# Patient Record
Sex: FEMALE | Race: OTHER | HISPANIC OR LATINO
[De-identification: names, ages, dates, MRNs, and addresses within clinical notes are randomized per-mention and may not be internally consistent; named-entity substitution may affect disease eponyms.]

---
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
## 2020-12-16 PROBLEM — Z87.09 HISTORY OF ACUTE SINUSITIS: Status: RESOLVED | Noted: 2018-01-16 | Resolved: 2020-12-16

## 2021-06-06 ENCOUNTER — EMERGENCY (EMERGENCY)
Facility: HOSPITAL | Age: 21
LOS: 1 days | Discharge: HOME | End: 2021-06-06
Attending: EMERGENCY MEDICINE | Admitting: EMERGENCY MEDICINE
Payer: COMMERCIAL

## 2021-06-06 VITALS
OXYGEN SATURATION: 100 % | TEMPERATURE: 98 F | DIASTOLIC BLOOD PRESSURE: 77 MMHG | RESPIRATION RATE: 16 BRPM | HEART RATE: 100 BPM | SYSTOLIC BLOOD PRESSURE: 128 MMHG

## 2021-06-06 VITALS
DIASTOLIC BLOOD PRESSURE: 61 MMHG | TEMPERATURE: 99 F | RESPIRATION RATE: 15 BRPM | SYSTOLIC BLOOD PRESSURE: 114 MMHG | OXYGEN SATURATION: 100 % | HEART RATE: 92 BPM

## 2021-06-06 DIAGNOSIS — R10.9 UNSPECIFIED ABDOMINAL PAIN: ICD-10-CM

## 2021-06-06 LAB
ALBUMIN SERPL ELPH-MCNC: 4.7 G/DL — SIGNIFICANT CHANGE UP (ref 3.3–5)
ALP SERPL-CCNC: 84 U/L — SIGNIFICANT CHANGE UP (ref 40–120)
ALT FLD-CCNC: 13 U/L — SIGNIFICANT CHANGE UP (ref 4–33)
ANION GAP SERPL CALC-SCNC: 14 MMOL/L — SIGNIFICANT CHANGE UP (ref 7–14)
APPEARANCE UR: ABNORMAL
APTT BLD: 29.6 SEC — SIGNIFICANT CHANGE UP (ref 27–36.3)
AST SERPL-CCNC: 16 U/L — SIGNIFICANT CHANGE UP (ref 4–32)
BACTERIA # UR AUTO: ABNORMAL
BASE EXCESS BLDV CALC-SCNC: -1.2 MMOL/L — SIGNIFICANT CHANGE UP (ref -3–2)
BASOPHILS # BLD AUTO: 0.1 K/UL — SIGNIFICANT CHANGE UP (ref 0–0.2)
BASOPHILS NFR BLD AUTO: 1.1 % — SIGNIFICANT CHANGE UP (ref 0–2)
BILIRUB SERPL-MCNC: 0.3 MG/DL — SIGNIFICANT CHANGE UP (ref 0.2–1.2)
BILIRUB UR-MCNC: NEGATIVE — SIGNIFICANT CHANGE UP
BLOOD GAS VENOUS - CREATININE: 0.8 MG/DL — SIGNIFICANT CHANGE UP (ref 0.5–1.3)
BLOOD GAS VENOUS COMPREHENSIVE RESULT: SIGNIFICANT CHANGE UP
BUN SERPL-MCNC: 9 MG/DL — SIGNIFICANT CHANGE UP (ref 7–23)
CALCIUM SERPL-MCNC: 9.2 MG/DL — SIGNIFICANT CHANGE UP (ref 8.4–10.5)
CHLORIDE BLDV-SCNC: 106 MMOL/L — SIGNIFICANT CHANGE UP (ref 96–108)
CHLORIDE SERPL-SCNC: 102 MMOL/L — SIGNIFICANT CHANGE UP (ref 98–107)
CO2 SERPL-SCNC: 20 MMOL/L — LOW (ref 22–31)
COLOR SPEC: YELLOW — SIGNIFICANT CHANGE UP
CREAT SERPL-MCNC: 0.75 MG/DL — SIGNIFICANT CHANGE UP (ref 0.5–1.3)
DIFF PNL FLD: NEGATIVE — SIGNIFICANT CHANGE UP
EOSINOPHIL # BLD AUTO: 0 K/UL — SIGNIFICANT CHANGE UP (ref 0–0.5)
EOSINOPHIL NFR BLD AUTO: 0 % — SIGNIFICANT CHANGE UP (ref 0–6)
EPI CELLS # UR: 23 /HPF — HIGH (ref 0–5)
GAS PNL BLDV: 136 MMOL/L — SIGNIFICANT CHANGE UP (ref 136–146)
GLUCOSE BLDV-MCNC: 109 MG/DL — HIGH (ref 70–99)
GLUCOSE SERPL-MCNC: 108 MG/DL — HIGH (ref 70–99)
GLUCOSE UR QL: NEGATIVE — SIGNIFICANT CHANGE UP
HCO3 BLDV-SCNC: 23 MMOL/L — SIGNIFICANT CHANGE UP (ref 20–27)
HCT VFR BLD CALC: 37.5 % — SIGNIFICANT CHANGE UP (ref 34.5–45)
HCT VFR BLDA CALC: 39 % — SIGNIFICANT CHANGE UP (ref 34.5–46.5)
HGB BLD CALC-MCNC: 12.7 G/DL — SIGNIFICANT CHANGE UP (ref 11.5–15.5)
HGB BLD-MCNC: 12 G/DL — SIGNIFICANT CHANGE UP (ref 11.5–15.5)
HYALINE CASTS # UR AUTO: 4 /LPF — SIGNIFICANT CHANGE UP (ref 0–7)
IANC: 8.15 K/UL — SIGNIFICANT CHANGE UP (ref 1.5–8.5)
INR BLD: 1.26 RATIO — HIGH (ref 0.88–1.16)
KETONES UR-MCNC: ABNORMAL
LACTATE BLDV-MCNC: 1.8 MMOL/L — SIGNIFICANT CHANGE UP (ref 0.5–2)
LEUKOCYTE ESTERASE UR-ACNC: NEGATIVE — SIGNIFICANT CHANGE UP
LYMPHOCYTES # BLD AUTO: 0.2 K/UL — LOW (ref 1–3.3)
LYMPHOCYTES # BLD AUTO: 2.1 % — LOW (ref 13–44)
MCHC RBC-ENTMCNC: 28.5 PG — SIGNIFICANT CHANGE UP (ref 27–34)
MCHC RBC-ENTMCNC: 32 GM/DL — SIGNIFICANT CHANGE UP (ref 32–36)
MCV RBC AUTO: 89.1 FL — SIGNIFICANT CHANGE UP (ref 80–100)
MONOCYTES # BLD AUTO: 0.61 K/UL — SIGNIFICANT CHANGE UP (ref 0–0.9)
MONOCYTES NFR BLD AUTO: 6.5 % — SIGNIFICANT CHANGE UP (ref 2–14)
NEUTROPHILS # BLD AUTO: 8.42 K/UL — HIGH (ref 1.8–7.4)
NEUTROPHILS NFR BLD AUTO: 90.3 % — HIGH (ref 43–77)
NITRITE UR-MCNC: NEGATIVE — SIGNIFICANT CHANGE UP
PCO2 BLDV: 33 MMHG — LOW (ref 41–51)
PH BLDV: 7.44 — HIGH (ref 7.32–7.43)
PH UR: 8.5 — HIGH (ref 5–8)
PLAT MORPH BLD: NORMAL — SIGNIFICANT CHANGE UP
PLATELET # BLD AUTO: 281 K/UL — SIGNIFICANT CHANGE UP (ref 150–400)
PLATELET COUNT - ESTIMATE: NORMAL — SIGNIFICANT CHANGE UP
PO2 BLDV: 26 MMHG — LOW (ref 35–40)
POTASSIUM BLDV-SCNC: 3.5 MMOL/L — SIGNIFICANT CHANGE UP (ref 3.4–4.5)
POTASSIUM SERPL-MCNC: 3.8 MMOL/L — SIGNIFICANT CHANGE UP (ref 3.5–5.3)
POTASSIUM SERPL-SCNC: 3.8 MMOL/L — SIGNIFICANT CHANGE UP (ref 3.5–5.3)
PROT SERPL-MCNC: 8 G/DL — SIGNIFICANT CHANGE UP (ref 6–8.3)
PROT UR-MCNC: ABNORMAL
PROTHROM AB SERPL-ACNC: 14.3 SEC — HIGH (ref 10.6–13.6)
RBC # BLD: 4.21 M/UL — SIGNIFICANT CHANGE UP (ref 3.8–5.2)
RBC # FLD: 13.2 % — SIGNIFICANT CHANGE UP (ref 10.3–14.5)
RBC BLD AUTO: NORMAL — SIGNIFICANT CHANGE UP
RBC CASTS # UR COMP ASSIST: 9 /HPF — HIGH (ref 0–4)
SAO2 % BLDV: 48.1 % — LOW (ref 60–85)
SARS-COV-2 RNA SPEC QL NAA+PROBE: SIGNIFICANT CHANGE UP
SMUDGE CELLS # BLD: PRESENT — SIGNIFICANT CHANGE UP
SODIUM SERPL-SCNC: 136 MMOL/L — SIGNIFICANT CHANGE UP (ref 135–145)
SP GR SPEC: 1.03 — HIGH (ref 1.01–1.02)
UROBILINOGEN FLD QL: SIGNIFICANT CHANGE UP
WBC # BLD: 9.33 K/UL — SIGNIFICANT CHANGE UP (ref 3.8–10.5)
WBC # FLD AUTO: 9.33 K/UL — SIGNIFICANT CHANGE UP (ref 3.8–10.5)
WBC UR QL: 14 /HPF — HIGH (ref 0–5)

## 2021-06-06 PROCEDURE — 99285 EMERGENCY DEPT VISIT HI MDM: CPT

## 2021-06-06 PROCEDURE — 76830 TRANSVAGINAL US NON-OB: CPT | Mod: 26

## 2021-06-06 RX ORDER — KETOROLAC TROMETHAMINE 30 MG/ML
15 SYRINGE (ML) INJECTION ONCE
Refills: 0 | Status: DISCONTINUED | OUTPATIENT
Start: 2021-06-06 | End: 2021-06-06

## 2021-06-06 RX ORDER — ONDANSETRON 8 MG/1
4 TABLET, FILM COATED ORAL ONCE
Refills: 0 | Status: COMPLETED | OUTPATIENT
Start: 2021-06-06 | End: 2021-06-06

## 2021-06-06 RX ADMIN — Medication 15 MILLIGRAM(S): at 15:13

## 2021-06-06 RX ADMIN — Medication 15 MILLIGRAM(S): at 13:54

## 2021-06-06 RX ADMIN — ONDANSETRON 4 MILLIGRAM(S): 8 TABLET, FILM COATED ORAL at 13:54

## 2021-06-06 RX ADMIN — Medication 15 MILLIGRAM(S): at 16:55

## 2021-06-06 NOTE — CONSULT NOTE ADULT - SUBJECTIVE AND OBJECTIVE BOX
GYN ED CONSULT NOTE    SUBJECTIVE:    22yo P0 LMP=5/15/21 PMH significant for chronic constipation presenting to the ED with LLQ that started the AM. She states that she was in bed this AM when she experienced sharp LLQ pain. She states that the pain was sharp and worse with movement. It was non-radiating. She had accompanying nausea and had one episode NBNB emesis because of the pain. She took po tylenol x1 with no relief. She came to the ED because of the pain. Since being in the ED her nausea has resolved. No further emesis. She is ambulating normally. Her pain was relieved with Toradol that she received.     Her last BM was 6/4. She states that she normally has regular BMs and takes Miralax daily and normally has a BM daily. She states that when she was 17 years old she had abdominal pain and was treated for constipation.     Pt denies fever, chills, diarrhea, headache, dizzyness, syncope, chest pain, palpitations, shortness of breath, dysuria, urgency, or vaginal bleeding.     In ED today she received Toradol She had a TVUS that showed a 3.7cm R hemorrhagic cyst. Normal appearing L ovary. Normal bilateral ovarian flow.     Primary OB/GYN: Dr. Galicia/Dr. Obregon  GYNH: denies hx of fibroids, ov cysts, abnl paps, sti. She states was on OCPs in 2019 for dysmenorrhea. She self discontinued.    PMSH:+constipation  MEDS: none  ALL: nkda  SOCH: denies t/e/d; safe at home  FAMH: denies fam hx of breast/uterine/ovarian/colon cancer  ROS: negative except per hpi    OBJECTIVE:    VITAL SIGNS:  Vital Signs Last 24 Hrs  T(C): 36.9 (2021 12:39), Max: 36.9 (2021 12:39)  T(F): 98.4 (2021 12:39), Max: 98.4 (2021 12:39)  HR: 106 (2021 15:14) (100 - 106)  BP: 126/78 (2021 15:14) (126/78 - 128/77)  BP(mean): --  RR: 16 (2021 15:14) (15 - 16)  SpO2: 100% (2021 15:14) (100% - 100%)    CAPILLARY BLOOD GLUCOSE    PHYSICAL EXAM:  GEN: NAD, A&Ox3  CV: RRR, no m/g/r  LUNGS: CTAB  ABD: soft, NT, ND, +BS, tender to deep palpation in LLQ. No rebound.   PELVIC: No CMT or adnexal tenderness.   EXT: WWP, no edema/TTP                            12.0   9.33  )-----------( 281      ( 2021 14:16 )             37.5   baso x      eos x      imm gran x      lymph x      mono x      poly x              136  |  102  |  9   ----------------------------<  108<H>  3.8   |  20<L>  |  0.75    Ca    9.2      2021 14:16    TPro  8.0  /  Alb  4.7  /  TBili  0.3  /  DBili  x   /  AST  16  /  ALT  13  /  AlkPhos  84        Urinalysis Basic - ( 2021 14:16 )    Color: Yellow / Appearance: Slightly Turbid / S.032 / pH: x  Gluc: x / Ketone: Trace  / Bili: Negative / Urobili: <2 mg/dL   Blood: x / Protein: 30 mg/dL / Nitrite: Negative   Leuk Esterase: Negative / RBC: 9 /HPF / WBC 14 /HPF   Sq Epi: x / Non Sq Epi: 23 /HPF / Bacteria: Many

## 2021-06-06 NOTE — ED PROVIDER NOTE - CLINICAL SUMMARY MEDICAL DECISION MAKING FREE TEXT BOX
Jorje: awoke with sever llq pain, family hx of ovarian cysts and kidney stones. No vaginal d/c. Will ensure not pregnant and get imaging. treat pain aggressively.

## 2021-06-06 NOTE — ED PROVIDER NOTE - PATIENT PORTAL LINK FT
You can access the FollowMyHealth Patient Portal offered by [facility] by registering at the following website: [email or web address]. By joining [facility]’s FollowMyHealth portal, you will also be able to view your health information using other applications (apps) compatible with our system.

## 2021-06-06 NOTE — ED PROVIDER NOTE - PROGRESS NOTE DETAILS
IVANNA Patel: GYN aware of pt. Ultrasound called, pt will be brought up for US IVANNA Patel: Pt resting comfortably. States pain has improved. Pt had a BM. Will continue to monitor IVANNA Patel: Pt feeling better. Pain continues to improve. Pt cleared by GYN.  The patient was given verbal and written discharge instructions. Specifically, instructions when to return to the ED and when to seek follow-up from their pcp was discussed. Any specialty follow-up was discussed, including how to make an appointment.  Any necessary referral lists provided.  Instructions were discussed in simple, plain language and was understood by the patient. The patient understands that should their symptoms worsen or any new symptoms arise, they should return to the ED immediately for further evaluation. All pt's questions were answered. Patient verbalizes understanding.

## 2021-06-06 NOTE — CONSULT NOTE ADULT - ASSESSMENT
20yo P0 LMP=5/15/21 PMH significant for chronic constipation presenting to the ED with LLQ that started the AM. TVUS that shows a 3.7cm hemorrhagic cyst on the R ovary which is contralateral to the patient's pain. No clinical signs at this time of ovarian torsion. Pt has a benign physical exam and ambulating without difficulty at this time. She states she is constipated and has not had a BM since 6/4. However given patient's episode of sharp pain at home and episode of emesis x1 earlier today in the setting of an ovarian cyst, the patient and mother were counseled on the differential diagnosis of ovarian torsion versus constipation.

## 2021-06-06 NOTE — ED ADULT NURSE REASSESSMENT NOTE - NS ED NURSE REASSESS COMMENT FT1
Pt resting comfortably in bed, states "I had a bowel movement. I am feeling a little better but I still feel that pain in the left side, It's more so discomfort right now." Vital signs stable at this time. Will reassess.

## 2021-06-06 NOTE — CONSULT NOTE ADULT - PROBLEM SELECTOR RECOMMENDATION 9
-Enema for constipation  -hold pain meds  -will reassess pain after enema.  -If patient develops abdominal pain or n/v returns then patient has been counseled on recommendation on proceeding with diagnostic laparoscopy to rule out ovarian torsion  -NPO      Pt evaluated and counseled by Dr. Grayson Treivno, pgy4 -Enema for constipation  -hold pain meds  -will reassess pain after enema.  -If patient develops abdominal pain or n/v returns then patient has been counseled on recommendation on proceeding with diagnostic laparoscopy to rule out ovarian torsion  -NPO  -urine neg for pregnancy     Pt evaluated and counseled by Dr. Grayson Trevino, pgy4

## 2021-06-06 NOTE — ED ADULT TRIAGE NOTE - CHIEF COMPLAINT QUOTE
llq pains since this am. denies problems urinating. vomited x 1 today lmp- 5/16. last bm yesterday- states feels constipated

## 2021-06-06 NOTE — ED ADULT NURSE NOTE - OBJECTIVE STATEMENT
Pt presents to intake, A&Ox4, ambulatory w/o assistance, here for evaluation of LLQ pain w/ nausea and vomiting since this morning. Pt states pain started suddenly this morning and progressively became worse. Denies chest pain, shortness of breath, palpitations, diaphoresis, headaches, fevers, dizziness, diarrhea, or urinary symptoms at this time. IV established in right arm with a 18G, labs drawn and sent, call bell in reach, warm blanket provided, bed in lowest position, side rails up x2, MD evaluation in progress. Will continue to monitor.

## 2021-06-06 NOTE — ED PROVIDER NOTE - ATTENDING CONTRIBUTION TO CARE
I performed a history and physical exam of the patient and discussed their management with the resident and /or advanced care provider. I reviewed the resident and /or ACP's note and agree with the documented findings and plan of care. My medical decision making and observations are found above.  Lungs clear abd with lower pain to palpation l>r

## 2021-06-06 NOTE — ED PROVIDER NOTE - NSFOLLOWUPINSTRUCTIONS_ED_ALL_ED_FT
Ruptured Ovarian Cyst    WHAT YOU NEED TO KNOW:    What is a ruptured ovarian cyst? A ruptured ovarian cyst is a cyst that breaks open. A cyst is a sac that grows on an ovary. This sac usually contains fluid, but may sometimes have blood or tissue in it. A large cyst that ruptures may lead to problems that need immediate care.    What causes or increases my risk for a ruptured ovarian cyst? You may be at higher risk for a ruptured ovarian cyst if you have polycystic ovarian syndrome (PCOS). PCOS causes many cysts to grow on your ovary. Any of the following can lead to a ruptured cyst:     Hormone changes around the time of your monthly period       Pressure on the cyst from sports, sex, or an injury to the area (usually large cysts)       Pregnancy    What are the signs and symptoms of a ruptured ovarian cyst? You may have no signs or symptoms, or you may have any of the following:     Pain that can range from mild to severe or be mild at first but become severe quickly       Sudden, sharp, or stabbing pain that happens on one side       Pain that starts during activity or sex, or that gets worse when you move       Tenderness in the area of your ovary       A low fever       Nausea, vomiting, or dizziness    How is a ruptured ovarian cyst diagnosed? Your healthcare provider will examine you and ask about your symptoms. If you have pain, tell your provider what you were doing when you first felt the pain. Include anything that helps or increases the pain. Tell your provider if you or anyone in your family has a history of breast or ovarian cancer, or PCOS. You may need any of the following:     Blood tests are used to check for pregnancy or an ectopic pregnancy. This is when a fertilized egg is growing in a fallopian tube instead of the womb. You may also need to have hormone levels checked. Blood tests may also be used to check for signs of an infection or tumor.       Ultrasound pictures may show a cyst on your ovary. For this test, an ultrasound wand is inserted into your vagina and guided up toward your uterus. This helps your healthcare provider get a closer look at your ovaries.     How is a ruptured ovarian cyst treated? Treatment depends on your age, the size of the cyst, and if it caused problems that need treatment. Treatment may not be needed if the cyst was small or your body absorbed the fluid that came out of the cyst when it ruptured. You may need any of the following:     NSAIDs, such as ibuprofen, help decrease swelling, pain, and fever. This medicine is available with or without a doctor's order. NSAIDs can cause stomach bleeding or kidney problems in certain people. If you take blood thinner medicine, always ask if NSAIDs are safe for you. Always read the medicine label and follow directions. Do not give these medicines to children under 6 months of age without direction from your child's healthcare provider.      Prescription pain medicine may be given. Ask your healthcare provider how to take this medicine safely. Some prescription pain medicines contain acetaminophen. Do not take other medicines that contain acetaminophen without talking to your healthcare provider. Too much acetaminophen may cause liver damage. Prescription pain medicine may cause constipation. Ask your healthcare provider how to prevent or treat constipation.       Antibiotics may be needed to prevent or fight an infection caused by bacteria.       Surgery may be needed to remove fluid or blood in the area of the ruptured cyst. The outside of the ruptured cyst may also need to be removed.     What can I do to manage or prevent a ruptured ovarian cyst?     Apply heat where you have pain, as directed. Heat can help relieve mild pain. Use a heating pad (set on low) or hot water bottle. Wrap the pad or bottle in a towel before you apply it to your skin. Apply heat for 20 minutes every hour, or as directed. A warm bath may also help relieve the pain.      Ask when to come in for a follow-up examination. You may need another ultrasound 6 weeks after your cyst was treated. This will help make sure the cyst is no longer growing or causing health problems. You may also need ultrasound tests for 2 or 3 monthly periods to see how hormones affect your ovaries.      Ask about birth control pills. These may help reduce your risk for cysts. Ask your healthcare provider if birth control pills are right for you. The risk for a blood clot is higher if you take birth control pills, especially if you are older than 35 or smoke.       Have a pelvic exam every year. This may also be called a well woman visit. The exam will include a Pap smear to check for certain cancers. Your healthcare provider will also press on your abdomen to check for lumps or other problems. A pelvic exam can help find problems early. This makes treatment easier and more effective. Tell your healthcare provider if you notice any changes in your monthly periods. Examples include periods that start on a different day than usual, or are lighter or heavier than usual. Tell your provider if you have worse pain than usual, or if the pain is different than you had before.    Call 911 for any of the following:     You are too weak or dizzy to stand up.        When should I seek immediate care?     You have severe pain in your pelvis or in your abdomen.       You have pain along with a fever, nausea, or vomiting.       You have signs of shock from blood loss, such as dizziness, cold or clammy skin, or fast breathing.    When should I contact my healthcare provider?     You notice changes in your monthly periods, or you begin to have nausea or vomiting with your periods.      You have new or worsening symptoms.      Your pain does not get better with pain medicine.      You have pain during sex.      You have bleeding from your vagina that is not your period.      Your abdomen is swollen, or you have a full or heavy feeling in your lower abdomen.      You have trouble urinating.      You have questions or concerns about your condition or care.    CARE AGREEMENT:    You have the right to help plan your care. Learn about your health condition and how it may be treated. Discuss treatment options with your healthcare providers to decide what care you want to receive. You always have the right to refuse treatment.

## 2021-06-06 NOTE — ED PROVIDER NOTE - OBJECTIVE STATEMENT
22 y/o F, denies any PMH, c/o waking up w/ severe LLQ pain this morning at 5AM. Pain has been constant but waxing and waning in intensity. Currently 8/10. Pt took Tylenol without relief. She vomiting twice NBNB. Denies having this pain before. Denies diarrhea, vaginal discharge, vaginal bleeding, dysuria or hematuria. Reports last sexual intercourse was 1-2 years ago.

## 2021-06-07 LAB
CULTURE RESULTS: SIGNIFICANT CHANGE UP
SPECIMEN SOURCE: SIGNIFICANT CHANGE UP

## 2021-11-03 ENCOUNTER — APPOINTMENT (OUTPATIENT)
Dept: OBGYN | Facility: CLINIC | Age: 21
End: 2021-11-03
Payer: COMMERCIAL

## 2021-11-03 VITALS
WEIGHT: 131 LBS | DIASTOLIC BLOOD PRESSURE: 64 MMHG | BODY MASS INDEX: 23.21 KG/M2 | HEIGHT: 63 IN | SYSTOLIC BLOOD PRESSURE: 100 MMHG

## 2021-11-03 DIAGNOSIS — Z83.49 FAMILY HISTORY OF OTHER ENDOCRINE, NUTRITIONAL AND METABOLIC DISEASES: ICD-10-CM

## 2021-11-03 PROCEDURE — 99395 PREV VISIT EST AGE 18-39: CPT

## 2021-11-03 PROCEDURE — 81003 URINALYSIS AUTO W/O SCOPE: CPT | Mod: QW

## 2021-11-04 LAB
BILIRUB UR QL STRIP: NORMAL
GLUCOSE UR-MCNC: NORMAL
HCG UR QL: 0.2 EU/DL
HGB UR QL STRIP.AUTO: NORMAL
KETONES UR-MCNC: NORMAL
LEUKOCYTE ESTERASE UR QL STRIP: NORMAL
NITRITE UR QL STRIP: NORMAL
PH UR STRIP: 5
PROT UR STRIP-MCNC: NORMAL
SP GR UR STRIP: 1.02

## 2021-11-05 PROBLEM — Z83.49 FAMILY HISTORY OF GRAVES' DISEASE: Status: ACTIVE | Noted: 2021-11-03

## 2021-11-05 RX ORDER — FLUCONAZOLE 150 MG/1
150 TABLET ORAL
Qty: 1 | Refills: 0 | Status: DISCONTINUED | COMMUNITY
Start: 2019-09-24 | End: 2021-11-05

## 2021-11-08 LAB — CYTOLOGY CVX/VAG DOC THIN PREP: NORMAL

## 2022-02-16 ENCOUNTER — NON-APPOINTMENT (OUTPATIENT)
Age: 22
End: 2022-02-16

## 2022-02-16 ENCOUNTER — APPOINTMENT (OUTPATIENT)
Dept: OBGYN | Facility: CLINIC | Age: 22
End: 2022-02-16
Payer: COMMERCIAL

## 2022-02-16 VITALS
SYSTOLIC BLOOD PRESSURE: 116 MMHG | DIASTOLIC BLOOD PRESSURE: 62 MMHG | HEIGHT: 63 IN | WEIGHT: 128 LBS | BODY MASS INDEX: 22.68 KG/M2

## 2022-02-16 PROCEDURE — 99213 OFFICE O/P EST LOW 20 MIN: CPT

## 2022-02-16 NOTE — PHYSICAL EXAM
[Normal] : external genitalia [Discharge] : a  ~M vaginal discharge was present [Heavy] : heavy [White] : white [Curds] : curd-like

## 2022-02-17 RX ORDER — TERCONAZOLE 8 MG/G
0.8 CREAM VAGINAL
Qty: 1 | Refills: 2 | Status: ACTIVE | COMMUNITY
Start: 2022-02-16 | End: 1900-01-01

## 2023-03-13 ENCOUNTER — APPOINTMENT (OUTPATIENT)
Dept: OBGYN | Facility: CLINIC | Age: 23
End: 2023-03-13
Payer: COMMERCIAL

## 2023-03-13 ENCOUNTER — LABORATORY RESULT (OUTPATIENT)
Age: 23
End: 2023-03-13

## 2023-03-13 VITALS
DIASTOLIC BLOOD PRESSURE: 72 MMHG | BODY MASS INDEX: 20.91 KG/M2 | WEIGHT: 118 LBS | HEIGHT: 63 IN | SYSTOLIC BLOOD PRESSURE: 110 MMHG

## 2023-03-13 DIAGNOSIS — Z01.419 ENCOUNTER FOR GYNECOLOGICAL EXAMINATION (GENERAL) (ROUTINE) W/OUT ABNORMAL FINDINGS: ICD-10-CM

## 2023-03-13 DIAGNOSIS — Z01.411 ENCOUNTER FOR GYNECOLOGICAL EXAMINATION (GENERAL) (ROUTINE) WITH ABNORMAL FINDINGS: ICD-10-CM

## 2023-03-13 DIAGNOSIS — B37.31 ACUTE CANDIDIASIS OF VULVA AND VAGINA: ICD-10-CM

## 2023-03-13 PROCEDURE — 99395 PREV VISIT EST AGE 18-39: CPT

## 2023-03-16 DIAGNOSIS — N89.8 OTHER SPECIFIED NONINFLAMMATORY DISORDERS OF VAGINA: ICD-10-CM

## 2023-03-17 LAB
C TRACH RRNA SPEC QL NAA+PROBE: NOT DETECTED
CANDIDA VAG CYTO: DETECTED
G VAGINALIS+PREV SP MTYP VAG QL MICRO: DETECTED
N GONORRHOEA RRNA SPEC QL NAA+PROBE: NOT DETECTED
SOURCE TP AMPLIFICATION: NORMAL
T VAGINALIS VAG QL WET PREP: NOT DETECTED

## 2023-03-17 RX ORDER — FLUCONAZOLE 150 MG/1
150 TABLET ORAL
Qty: 1 | Refills: 1 | Status: ACTIVE | COMMUNITY
Start: 2023-03-16 | End: 1900-01-01

## 2023-03-17 RX ORDER — METRONIDAZOLE 7.5 MG/G
0.75 GEL VAGINAL
Qty: 1 | Refills: 0 | Status: ACTIVE | COMMUNITY
Start: 2023-03-16 | End: 1900-01-01

## 2023-05-01 ENCOUNTER — APPOINTMENT (OUTPATIENT)
Dept: OBGYN | Facility: CLINIC | Age: 23
End: 2023-05-01
Payer: COMMERCIAL

## 2023-05-01 DIAGNOSIS — R87.610 ATYPICAL SQUAMOUS CELLS OF UNDETERMINED SIGNIFICANCE ON CYTOLOGIC SMEAR OF CERVIX (ASC-US): ICD-10-CM

## 2023-05-01 DIAGNOSIS — R87.810 ATYPICAL SQUAMOUS CELLS OF UNDETERMINED SIGNIFICANCE ON CYTOLOGIC SMEAR OF CERVIX (ASC-US): ICD-10-CM

## 2023-05-01 PROCEDURE — 57454 BX/CURETT OF CERVIX W/SCOPE: CPT

## 2023-06-12 DIAGNOSIS — N63.0 UNSPECIFIED LUMP IN UNSPECIFIED BREAST: ICD-10-CM

## 2023-08-25 ENCOUNTER — NON-APPOINTMENT (OUTPATIENT)
Age: 23
End: 2023-08-25

## 2024-04-08 NOTE — ED ADULT NURSE NOTE - NS ED NURSE LEVEL OF CONSCIOUSNESS AFFECT
Calm Additional Notes: Previously had Moh’s but cancelled \\nDiscussion today regarding treatment options, explained that given size and subtype should be treated with Moh’s\\nWill refer back to Moh’s Detail Level: Simple Render Risk Assessment In Note?: no Additional Notes: recheck at fu visit, photo documentation obtained\\nslightly hypopigmented nummular area w/ focal areas of erythema, scale. ddx resolving dermatitis vs less likely large sccis. recheck in 3 mo

## 2024-04-15 RX ORDER — DROSPIRENONE, ETHINYL ESTRADIOL AND LEVOMEFOLATE CALCIUM AND LEVOMEFOLATE CALCIUM 3-0.02(24)
3-0.02-0.451 KIT ORAL
Qty: 3 | Refills: 0 | Status: ACTIVE | COMMUNITY
Start: 2019-09-24 | End: 1900-01-01

## 2024-05-15 ENCOUNTER — APPOINTMENT (OUTPATIENT)
Dept: OBGYN | Facility: CLINIC | Age: 24
End: 2024-05-15

## 2024-05-28 ENCOUNTER — NON-APPOINTMENT (OUTPATIENT)
Age: 24
End: 2024-05-28

## 2024-05-28 ENCOUNTER — EMERGENCY (EMERGENCY)
Facility: HOSPITAL | Age: 24
LOS: 1 days | Discharge: HOME | End: 2024-05-28
Admitting: STUDENT IN AN ORGANIZED HEALTH CARE EDUCATION/TRAINING PROGRAM
Payer: COMMERCIAL

## 2024-05-28 VITALS
TEMPERATURE: 99 F | OXYGEN SATURATION: 98 % | DIASTOLIC BLOOD PRESSURE: 69 MMHG | HEART RATE: 80 BPM | SYSTOLIC BLOOD PRESSURE: 116 MMHG | RESPIRATION RATE: 18 BRPM

## 2024-05-28 VITALS
OXYGEN SATURATION: 98 % | SYSTOLIC BLOOD PRESSURE: 102 MMHG | RESPIRATION RATE: 17 BRPM | TEMPERATURE: 98 F | HEART RATE: 78 BPM | DIASTOLIC BLOOD PRESSURE: 68 MMHG

## 2024-05-28 LAB
APPEARANCE UR: ABNORMAL
BACTERIA # UR AUTO: ABNORMAL /HPF
BILIRUB UR-MCNC: NEGATIVE — SIGNIFICANT CHANGE UP
CAST: 2 /LPF — SIGNIFICANT CHANGE UP (ref 0–4)
COLOR SPEC: YELLOW — SIGNIFICANT CHANGE UP
DIFF PNL FLD: NEGATIVE — SIGNIFICANT CHANGE UP
GLUCOSE UR QL: NEGATIVE MG/DL — SIGNIFICANT CHANGE UP
HIV 1+2 AB+HIV1 P24 AG SERPL QL IA: SIGNIFICANT CHANGE UP
KETONES UR-MCNC: NEGATIVE MG/DL — SIGNIFICANT CHANGE UP
LEUKOCYTE ESTERASE UR-ACNC: ABNORMAL
NITRITE UR-MCNC: NEGATIVE — SIGNIFICANT CHANGE UP
PH UR: 5.5 — SIGNIFICANT CHANGE UP (ref 5–8)
PROT UR-MCNC: NEGATIVE MG/DL — SIGNIFICANT CHANGE UP
RBC CASTS # UR COMP ASSIST: 4 /HPF — SIGNIFICANT CHANGE UP (ref 0–4)
REVIEW: SIGNIFICANT CHANGE UP
SP GR SPEC: 1.02 — SIGNIFICANT CHANGE UP (ref 1–1.03)
SQUAMOUS # UR AUTO: 3 /HPF — SIGNIFICANT CHANGE UP (ref 0–5)
UROBILINOGEN FLD QL: 0.2 MG/DL — SIGNIFICANT CHANGE UP (ref 0.2–1)
WBC UR QL: 104 /HPF — HIGH (ref 0–5)

## 2024-05-28 PROCEDURE — 99284 EMERGENCY DEPT VISIT MOD MDM: CPT

## 2024-05-28 RX ORDER — NITROFURANTOIN MACROCRYSTAL 50 MG
1 CAPSULE ORAL
Qty: 9 | Refills: 0
Start: 2024-05-28 | End: 2024-06-01

## 2024-05-28 RX ORDER — NITROFURANTOIN MACROCRYSTAL 50 MG
100 CAPSULE ORAL ONCE
Refills: 0 | Status: COMPLETED | OUTPATIENT
Start: 2024-05-28 | End: 2024-05-28

## 2024-05-28 RX ADMIN — Medication 100 MILLIGRAM(S): at 22:19

## 2024-05-28 NOTE — ED PROVIDER NOTE - CLINICAL SUMMARY MEDICAL DECISION MAKING FREE TEXT BOX
25 y/o female with no significant PMHx presents to the ER c/o external vaginal discomfort x 2+weeks.  Pt also reports suprapubic pressure.  Pt is well appearing, NAD, will obtain UA, STD testing, follow up PMD and GYN.

## 2024-05-28 NOTE — ED ADULT TRIAGE NOTE - ARRIVAL FROM
Advance Care Planning   Healthcare Decision Maker:       Primary Decision Maker: Jermain Smith Child - [phone]
Home

## 2024-05-28 NOTE — ED PROVIDER NOTE - NSFOLLOWUPINSTRUCTIONS_ED_ALL_ED_FT
Follow up with your Primary Medical Doctor in 1-2 days.  Follow up with your Gynecologist in 1-2 days.  Rest.  Stay well hydrated.  Return to the ER for any persistent/worsening or new symptoms fevers, chills, discharge, pain, nausea, vomiting, or any concerning symptoms. Follow up with your Primary Medical Doctor in 1-2 days.  Follow up with your Gynecologist in 1-2 days.  Take Macrobid one tablet orally 2 x a day x 5 days.  Rest.  Stay well hydrated.  Return to the ER for any persistent/worsening or new symptoms fevers, chills, discharge, pain, nausea, vomiting, or any concerning symptoms.

## 2024-05-28 NOTE — ED PROVIDER NOTE - PROGRESS NOTE DETAILS
IVANNA Carpenter: pt feels better ambulating without difficulty.  Results reviewed with patient.  Discharge reviewed and discussed with patient.

## 2024-05-28 NOTE — ED ADULT NURSE NOTE - NSFALLUNIVINTERV_ED_ALL_ED
Bed/Stretcher in lowest position, wheels locked, appropriate side rails in place/Call bell, personal items and telephone in reach/Instruct patient to call for assistance before getting out of bed/chair/stretcher/Non-slip footwear applied when patient is off stretcher/[place] to call system/Physically safe environment - no spills, clutter or unnecessary equipment/Purposeful proactive rounding/Room/bathroom lighting operational, light cord in reach

## 2024-05-28 NOTE — ED PROVIDER NOTE - OBJECTIVE STATEMENT
25 y/o female with no significant PMHx presents to the ER c/o external vaginal discomfort x 2+weeks.  Pt also reports suprapubic pressure.  Pt denies fevers, chills, abdominal pain, nausea, vomiting, discharge, STD concerns, rash, dysuria, frequency, STD concerns.

## 2024-05-28 NOTE — ED PROVIDER NOTE - PATIENT PORTAL LINK FT
You can access the FollowMyHealth Patient Portal offered by [facility] by registering at the following website: [email or web address]. By joining [employer]’s FollowMyHealth portal, you will also be able to view your health information using other applications (apps) compatible with our system.

## 2024-05-28 NOTE — ED ADULT NURSE NOTE - OBJECTIVE STATEMENT
Pt received to wellness, A&Ox4 and ambulatory, c/o abdominal pain and vaginal discomfort x2 weeks. Pt has no prior medical history. Reports suprapubic pressure as well. Denies fevers, chills, nausea, vomiting, rash, abnormal discharge, dysuria, STD concern, chest pain, SOB. UCG neg. Labs sent as ordered, pending dispo.

## 2024-05-28 NOTE — ED PROVIDER NOTE - CARE PLAN
1 Principal Discharge DX:	Vaginal discomfort   Principal Discharge DX:	Acute UTI  Secondary Diagnosis:	Vaginal discomfort

## 2024-05-28 NOTE — ED ADULT TRIAGE NOTE - CHIEF COMPLAINT QUOTE
c/o vaginal discomfort onset 2 weeks ago reports pain is now travelling up to lower abdomen, denies fever, chills, foul smelling urine, phx fo PCOS.

## 2024-05-29 LAB
C TRACH RRNA SPEC QL NAA+PROBE: SIGNIFICANT CHANGE UP
HCV AB S/CO SERPL IA: 0.18 S/CO — SIGNIFICANT CHANGE UP (ref 0–0.99)
HCV AB SERPL-IMP: SIGNIFICANT CHANGE UP
N GONORRHOEA RRNA SPEC QL NAA+PROBE: SIGNIFICANT CHANGE UP
SPECIMEN SOURCE: SIGNIFICANT CHANGE UP

## 2024-05-30 LAB
CULTURE RESULTS: SIGNIFICANT CHANGE UP
SPECIMEN SOURCE: SIGNIFICANT CHANGE UP

## 2024-07-10 ENCOUNTER — APPOINTMENT (OUTPATIENT)
Dept: OBGYN | Facility: CLINIC | Age: 24
End: 2024-07-10
Payer: COMMERCIAL

## 2024-07-10 VITALS
HEIGHT: 63 IN | WEIGHT: 118 LBS | SYSTOLIC BLOOD PRESSURE: 120 MMHG | BODY MASS INDEX: 20.91 KG/M2 | DIASTOLIC BLOOD PRESSURE: 66 MMHG

## 2024-07-10 DIAGNOSIS — Z01.419 ENCOUNTER FOR GYNECOLOGICAL EXAMINATION (GENERAL) (ROUTINE) W/OUT ABNORMAL FINDINGS: ICD-10-CM

## 2024-07-10 DIAGNOSIS — Z78.9 OTHER SPECIFIED HEALTH STATUS: ICD-10-CM

## 2024-07-10 DIAGNOSIS — Z84.2 FAMILY HISTORY OF OTHER DISEASES OF THE GENITOURINARY SYSTEM: ICD-10-CM

## 2024-07-10 PROCEDURE — 99395 PREV VISIT EST AGE 18-39: CPT

## 2024-07-11 PROBLEM — Z78.9 SOCIAL ALCOHOL USE: Status: ACTIVE | Noted: 2024-07-10

## 2024-07-11 PROBLEM — Z84.2 FAMILY HISTORY OF OVARIAN CYST: Status: ACTIVE | Noted: 2024-07-10

## 2024-07-17 LAB — CYTOLOGY CVX/VAG DOC THIN PREP: NORMAL

## 2025-08-02 ENCOUNTER — NON-APPOINTMENT (OUTPATIENT)
Age: 25
End: 2025-08-02

## 2025-08-04 ENCOUNTER — APPOINTMENT (OUTPATIENT)
Dept: OBGYN | Facility: CLINIC | Age: 25
End: 2025-08-04
Payer: COMMERCIAL

## 2025-08-04 VITALS
DIASTOLIC BLOOD PRESSURE: 68 MMHG | SYSTOLIC BLOOD PRESSURE: 108 MMHG | WEIGHT: 119 LBS | BODY MASS INDEX: 21.09 KG/M2 | HEIGHT: 63 IN

## 2025-08-04 DIAGNOSIS — Z01.419 ENCOUNTER FOR GYNECOLOGICAL EXAMINATION (GENERAL) (ROUTINE) W/OUT ABNORMAL FINDINGS: ICD-10-CM

## 2025-08-04 DIAGNOSIS — Z87.42 PERSONAL HISTORY OF OTHER DISEASES OF THE FEMALE GENITAL TRACT: ICD-10-CM

## 2025-08-04 DIAGNOSIS — Z80.3 FAMILY HISTORY OF MALIGNANT NEOPLASM OF BREAST: ICD-10-CM

## 2025-08-04 PROCEDURE — 99395 PREV VISIT EST AGE 18-39: CPT

## 2025-08-06 LAB — CYTOLOGY CVX/VAG DOC THIN PREP: NORMAL

## 2025-08-12 PROBLEM — Z87.42 HISTORY OF OVARIAN CYST: Status: RESOLVED | Noted: 2025-08-04 | Resolved: 2025-08-12
